# Patient Record
Sex: FEMALE | Race: WHITE | NOT HISPANIC OR LATINO | URBAN - METROPOLITAN AREA
[De-identification: names, ages, dates, MRNs, and addresses within clinical notes are randomized per-mention and may not be internally consistent; named-entity substitution may affect disease eponyms.]

---

## 2017-01-01 PROBLEM — Z96.1: Noted: 2017-01-01

## 2017-01-01 PROBLEM — Z98.890: Noted: 2022-01-11

## 2018-01-05 NOTE — PATIENT DISCUSSION
(O38.551) Keratoconjunct sicca, not specified as Sjogren's, bilateral - Assesment : Examination revealed Dry Eye Syndrome OU. Pt uses Systane. - Plan : Continue systane drops 4-6 times daily OU. Monitor for changes. Advised patient to call our office with decreased vision or increased symptoms.

## 2018-01-05 NOTE — PATIENT DISCUSSION
(H01.005) Unspecified blepharitis left lower eyelid - Assesment : Examination revealed Blepharitis. - Plan : see plan #1.

## 2018-01-05 NOTE — PATIENT DISCUSSION
(H01.002) Unspecified blepharitis right lower eyelid - Assesment : Examination revealed Blepharitis may be contributing to discomfort and/or pressure. IOP is stable today Ou. S/p LPI OU. Hx of recent bronchitis and sinus infection. - Plan : Recommend Warm compresses twice daily. May consider follow up with PCP to rule out sinus pressure. Monitor for changes. Keep scheduled appt.

## 2018-01-05 NOTE — PATIENT DISCUSSION
(N08.752) Vitreous degeneration, bilateral - Assesment : Examination revealed PVD Ou. - Plan : Monitor for changes. Advised patient to call our office with decreased vision or an increase in flashes and/or floaters.

## 2018-01-10 NOTE — PATIENT DISCUSSION
(F22.040) Keratoconjunct sicca, not specified as Sjogren's, bilateral - Assesment : Examination revealed Dry Eye Syndrome OU. - Plan : Recommend continue artificial tears 3-4 times daily OU. Monitor for changes. Advised patient to call our office with decreased vision or increased symptoms.

## 2018-01-10 NOTE — PATIENT DISCUSSION
(E48.2913) Nonexudative age-related macular degeneration bilateral elen - Assesment : Examination revealed AMD Dry OU. MAC OCT OD - blunt foveal contour, ERM, drusen, atrophic changes and pigment clumping. OS - blunt foveal contour, drusen - Plan : Recommend AREDs 2 vitamins and amsler grid testing. Monitor for changes. Advised patient to call our office with decreased vision or increased distortion. RV 6 months follow up/MAC OCT.

## 2018-01-10 NOTE — PATIENT DISCUSSION
(E11.9) Type 2 diabetes mellitus without complications - Assesment : Patient has type II diabetes with no ocular complications. - Plan : Continue following with PCP for routine care. Stressed importance of keeping A1c levels below 7.0 and monitoring blood sugar. Letter explaining today's findings faxed to patient's PCP.

## 2018-01-10 NOTE — PATIENT DISCUSSION
(G25.316) Vitreous degeneration, bilateral - Assesment : Examination revealed PVD OU. - Plan : Monitor for changes. Advised patient to call our office with decreased vision or an increase in flashes and/or floaters.

## 2018-01-10 NOTE — PATIENT DISCUSSION
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 - Plan : Monitor for changes.

## 2018-01-10 NOTE — PATIENT DISCUSSION
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 - Plan : Monitor for changes.

## 2018-08-08 NOTE — PATIENT DISCUSSION
(W37.100) Keratoconjunct sicca, not specified as Sjogren's, bilateral - Assesment : Examination revealed Dry Eye Syndrome OU. - Plan : Continue artificial tears 3-4 times daily OU. Suggestions: Blink, Optive, Thera Tears or GenTeal drops 3-4 times daily. Monitor for changes. Advised patient to call our office with decreased vision or increased symptoms.

## 2018-08-08 NOTE — PATIENT DISCUSSION
(H01.002) Unspecified blepharitis right lower eyelid - Assesment : Examination revealed Blepharitis may be contributing to discomfort and/or pressure. - Plan : Continue warm compresses twice daily. Start Erythromycin along lash line top and bottom QHS 3-4 weeks. Monitor for changes. If no improvement may consider punctal plugs or Restasis.   RTC 1-2 months follow up

## 2019-07-02 NOTE — PATIENT DISCUSSION
(X74.838) Vitreous degeneration, bilateral - Assesment : Examination revealed a posterior vitreous detachment. - Plan : Monitor for changes. Advised patient to call our office with decreased vision or an increase in flashes and/or floaters.

## 2019-07-02 NOTE — PATIENT DISCUSSION
(H35.371) Puckering of macula, right eye - Assesment : Examination revealed ERM OD. OCT mac today suggests,  pigment clumps, ERM OU - Plan : Monitor. Advised patient to call our office with decreased vision or increased symptoms.

## 2019-07-02 NOTE — PATIENT DISCUSSION
(X16.142) Keratoconjunct sicca, not specified as Sjogren's, bilateral - Assesment : Examination revealed Dry Eye Syndrome OU. - Plan : Recommend continue artificial tears 3-4 times daily OU. Suggestions, Blink, GenTeal, or Optive. Monitor for changes. Advised patient to call our office with decreased vision or increased symptoms.

## 2019-07-02 NOTE — PATIENT DISCUSSION
(E11.9) Type 2 diabetes mellitus without complications - Assesment : Patient has type II diabetes with no ocular complications. - Plan : Continue following with PCP for routine care. Stressed importance of keeping A1c levels below 7.0 and monitoring blood sugar. Letter explaining today's findings faxed to patient's PCP. Glasses Rx updated and  given.   RTC 1 year/EXAM

## 2021-07-21 ENCOUNTER — NEW PATIENT COMPREHENSIVE (OUTPATIENT)
Dept: URBAN - METROPOLITAN AREA CLINIC 52 | Facility: CLINIC | Age: 81
End: 2021-07-21

## 2021-07-21 DIAGNOSIS — H35.3130: ICD-10-CM

## 2021-07-21 DIAGNOSIS — H02.834: ICD-10-CM

## 2021-07-21 DIAGNOSIS — H02.831: ICD-10-CM

## 2021-07-21 PROCEDURE — 92004 COMPRE OPH EXAM NEW PT 1/>: CPT

## 2021-07-21 PROCEDURE — 92134 CPTRZ OPH DX IMG PST SGM RTA: CPT

## 2021-07-21 ASSESSMENT — TONOMETRY
OS_IOP_MMHG: 14
OD_IOP_MMHG: 15

## 2021-07-21 ASSESSMENT — VISUAL ACUITY
OU_CC: J2
OU_OTHER: OTC +1.50
OS_GLARE: 20/40
OD_SC: 20/50-1
OD_GLARE: 20/50
OS_PH: 20/40
OS_GLARE: 20/50
OU_SC: 20/50
OS_SC: 20/50-1
OD_GLARE: 20/400

## 2021-07-21 ASSESSMENT — KERATOMETRY
OD_K1POWER_DIOPTERS: 49.00
OD_AXISANGLE2_DEGREES: 120
OD_AXISANGLE_DEGREES: 030
OD_K2POWER_DIOPTERS: 48.25

## 2021-08-27 ENCOUNTER — DIAGNOSTICS - PTOSIS VF/PHOTOS (OUTPATIENT)
Dept: URBAN - METROPOLITAN AREA CLINIC 52 | Facility: CLINIC | Age: 81
End: 2021-08-27

## 2021-08-27 DIAGNOSIS — H02.834: ICD-10-CM

## 2021-08-27 DIAGNOSIS — H02.831: ICD-10-CM

## 2021-08-27 PROCEDURE — 92285 EXTERNAL OCULAR PHOTOGRAPHY: CPT

## 2021-08-27 PROCEDURE — 92082 INTERMEDIATE VISUAL FIELD XM: CPT

## 2021-08-27 ASSESSMENT — KERATOMETRY
OD_K1POWER_DIOPTERS: 49.00
OD_AXISANGLE_DEGREES: 030
OD_K2POWER_DIOPTERS: 48.25
OD_AXISANGLE2_DEGREES: 120

## 2021-10-08 ENCOUNTER — LID CONSULT (OUTPATIENT)
Dept: URBAN - METROPOLITAN AREA CLINIC 52 | Facility: CLINIC | Age: 81
End: 2021-10-08

## 2021-10-08 DIAGNOSIS — H02.403: ICD-10-CM

## 2021-10-08 DIAGNOSIS — G70.00: ICD-10-CM

## 2021-10-08 PROCEDURE — 92012 INTRM OPH EXAM EST PATIENT: CPT

## 2021-10-08 ASSESSMENT — VISUAL ACUITY
OD_SC: 20/50
OS_SC: 20/40

## 2021-10-08 ASSESSMENT — KERATOMETRY
OD_K2POWER_DIOPTERS: 48.25
OD_AXISANGLE2_DEGREES: 120
OD_K1POWER_DIOPTERS: 49.00
OD_AXISANGLE_DEGREES: 030

## 2021-10-08 ASSESSMENT — TONOMETRY
OS_IOP_MMHG: 15
OD_IOP_MMHG: 15

## 2022-01-07 ENCOUNTER — PRE-OP/H&P (OUTPATIENT)
Dept: URBAN - METROPOLITAN AREA CLINIC 52 | Facility: CLINIC | Age: 82
End: 2022-01-07

## 2022-01-07 VITALS — SYSTOLIC BLOOD PRESSURE: 146 MMHG | DIASTOLIC BLOOD PRESSURE: 66 MMHG | HEIGHT: 55 IN

## 2022-01-07 DIAGNOSIS — H02.423: ICD-10-CM

## 2022-01-07 PROCEDURE — PREOP PRE OP VISIT

## 2022-01-07 ASSESSMENT — VISUAL ACUITY
OS_SC: 20/40
OD_SC: 20/50
OU_SC: 20/40

## 2022-01-07 ASSESSMENT — KERATOMETRY
OD_AXISANGLE2_DEGREES: 120
OD_AXISANGLE_DEGREES: 030
OD_K1POWER_DIOPTERS: 49.00
OD_K2POWER_DIOPTERS: 48.25

## 2022-01-11 ENCOUNTER — SURGERY/PROCEDURE (OUTPATIENT)
Dept: URBAN - METROPOLITAN AREA SURGERY 16 | Facility: SURGERY | Age: 82
End: 2022-01-11

## 2022-01-11 DIAGNOSIS — H02.423: ICD-10-CM

## 2022-01-11 PROCEDURE — 67904 REPAIR EYELID DEFECT: CPT

## 2022-01-11 RX ORDER — ERYTHROMYCIN 5 MG/G
OINTMENT OPHTHALMIC
Start: 2022-01-11

## 2022-01-11 ASSESSMENT — KERATOMETRY
OD_K1POWER_DIOPTERS: 49.00
OD_AXISANGLE2_DEGREES: 120
OD_K2POWER_DIOPTERS: 48.25
OD_AXISANGLE_DEGREES: 030

## 2022-01-21 ENCOUNTER — POST-OP (OUTPATIENT)
Dept: URBAN - METROPOLITAN AREA CLINIC 52 | Facility: CLINIC | Age: 82
End: 2022-01-21

## 2022-01-21 DIAGNOSIS — Z98.890: ICD-10-CM

## 2022-01-21 PROCEDURE — 99024 POSTOP FOLLOW-UP VISIT: CPT

## 2022-01-21 ASSESSMENT — KERATOMETRY
OD_K2POWER_DIOPTERS: 48.25
OD_AXISANGLE_DEGREES: 030
OD_AXISANGLE2_DEGREES: 120
OD_K1POWER_DIOPTERS: 49.00

## 2022-01-21 ASSESSMENT — VISUAL ACUITY
OS_SC: 20/40+2
OD_SC: 20/40

## 2022-05-13 ENCOUNTER — POST-OP (OUTPATIENT)
Dept: URBAN - METROPOLITAN AREA CLINIC 52 | Facility: CLINIC | Age: 82
End: 2022-05-13

## 2022-05-13 DIAGNOSIS — H04.122: ICD-10-CM

## 2022-05-13 PROCEDURE — 99024 POSTOP FOLLOW-UP VISIT: CPT

## 2022-05-13 ASSESSMENT — VISUAL ACUITY
OS_SC: 20/30
OD_SC: 20/30
OS_PH: 20/25-2

## 2022-05-13 ASSESSMENT — KERATOMETRY
OD_AXISANGLE2_DEGREES: 120
OD_K1POWER_DIOPTERS: 49.00
OD_AXISANGLE_DEGREES: 030
OD_K2POWER_DIOPTERS: 48.25